# Patient Record
Sex: FEMALE | Employment: OTHER
[De-identification: names, ages, dates, MRNs, and addresses within clinical notes are randomized per-mention and may not be internally consistent; named-entity substitution may affect disease eponyms.]

---

## 2024-01-24 ENCOUNTER — HOSPITAL ENCOUNTER (OUTPATIENT)
Facility: HOSPITAL | Age: 39
Setting detail: RECURRING SERIES
Discharge: HOME | End: 2024-01-27
Payer: OTHER GOVERNMENT

## 2024-01-24 PROCEDURE — 97535 SELF CARE MNGMENT TRAINING: CPT

## 2024-01-24 PROCEDURE — 97162 PT EVAL MOD COMPLEX 30 MIN: CPT

## 2024-01-24 PROCEDURE — 97110 THERAPEUTIC EXERCISES: CPT

## 2024-01-24 NOTE — PROGRESS NOTES
FOTO score to 55 points in order to maximize function and promote patient satisfaction with overall outcome  Eval Status: FOTO 45  FOTO score = an established functional score where 100 = no disability    Patient will improve pain in left shoulder to 5/10 at worst to improve dressing tolerance and restore prior level of function.  Eval Status: 10/10 at worst    Pt will have 3/5 left shoulder flexion strength to return to goals of improved dressing and overhead motions.  Eval Status:   Shoulder Left (1-5) Right (1-5)   Shoulder Flexion 2 4   Shoulder ABD 2 4   Shoulder IR 3 4   Shoulder ER 3 3+   Elbow flexion 4- 4+       Pt will have painfree left shoulder AAROM WFL to aid in functional mechanics for ADLs.  Eval Status:   Shoulder Left   Flexion 132   Extension NT      ER neutral   IR 22     *AAROM IR/ER tested at 45dgs     Long Term Goals: To be accomplished in 24 treatments:  Patient will improve FOTO score to 72 points in order to maximize function and promote patient satisfaction with overall outcome.  Eval Status: FOTO 45  FOTO score = an established functional score where 100 = no disability    Pt will have painfree left shoulder AROM WFL to aid in functional mechanics for ADLs.  Eval Status:   Shoulder Left Right   Flexion 115 160   Extension 36 60   ABD 97 163   ER C4 T1   IR Ishial tub T4     *AAROM IR/ER tested at 45dgs     Pt will have 5/5 katya UE strength to return to goals of improved lifting and carrying objects needed to perform ADLs and IADLs.  Eval Status:   Shoulder Left (1-5) Right (1-5)   Shoulder Flexion 2 4   Shoulder ABD 2 4   Shoulder IR 3 4   Shoulder ER 3 3+   Elbow flexion 4- 4+       Patient will improve pain in left shoulder to 1-2/10 at worst to improve lifting and reaching tolerance and restore prior level of function.  Eval Status: 10/10 at worst    PLAN  [x]  Upgrade activities as tolerated     [x]  Continue plan of care  [x]  Update interventions per flow sheet       []

## 2024-01-24 NOTE — PROGRESS NOTES
treatments    Patient/ Caregiver education and instruction: Diagnosis, prognosis, self care, activity modification, and exercises     [x]  Plan of care has been reviewed with PTA    Certification Period: CONNIE Kapadia, PT 1/24/2024 12:53 PM  ____________________________________________________________________  I certify that the above Therapy Services are being furnished while the patient is under my care. I agree with the treatment plan and certify that this therapy is necessary.    Physician's Signature:____________________________Date:_________TIME:________                                      Jolie Puri,*  Insurance: Payor:  EAST / Plan:  EAST / Product Type: *No Product type* /      ** Signature, Date and Time must be completed for valid certification **    [facility]  [place] [address]  Ph [phone]  Fx [phone]

## 2024-01-29 ENCOUNTER — HOSPITAL ENCOUNTER (OUTPATIENT)
Facility: HOSPITAL | Age: 39
Setting detail: RECURRING SERIES
Discharge: HOME | End: 2024-02-01
Payer: OTHER GOVERNMENT

## 2024-01-29 PROCEDURE — 97110 THERAPEUTIC EXERCISES: CPT

## 2024-01-29 PROCEDURE — 97535 SELF CARE MNGMENT TRAINING: CPT

## 2024-01-29 PROCEDURE — 97530 THERAPEUTIC ACTIVITIES: CPT

## 2024-01-29 NOTE — PROGRESS NOTES
PHYSICAL / OCCUPATIONAL THERAPY - DAILY TREATMENT NOTE     Patient Name: Liset Costa    Date: 2024    : 1985  Insurance: Payor:  EAST / Plan: [facility] / Product Type: *No Product type* /      Patient  verified Yes     Visit #   Current / Total 2 24   Time   In / Out 11:49 12:43   Pain   In / Out 2/10 2/10   Subjective Functional Status/Changes: \"I just have some pain in my shoulder most of the time but, it gets higher when I raise it above my head.\"   Changes to:  Allergies, Med Hx, Sx Hx?   no       TREATMENT AREA =  Right shoulder pain [M25.511]  Left shoulder pain [M25.512]    OBJECTIVE    Modalities Rationale:     decrease edema, decrease inflammation, and decrease pain to improve patient's ability to progress to PLOF and address remaining functional goals.     min [] Estim Unattended, type/location:                                      []  w/ice    []  w/heat    min [] Estim Attended, type/location:                                     []  w/US     []  w/ice    []  w/heat    []  TENS insruct      min []  Mechanical Traction: type/lbs                   []  pro   []  sup   []  int   []  cont    []  before manual    []  after manual    min []  Ultrasound, settings/location:      min []  Iontophoresis w/ dexamethasone, location:                                               []  take home patch       []  in clinic        min  unbilled []  Ice     []  Heat    location/position:     min []  Paraffin,  details:    10 min [x]  Vasopneumatic Device, press/temp: Med/Low    min []  Whirlpool / Fluido:    If using vaso (only need to measure limb vaso being performed on)      pre-treatment girth : 38.5 cm,      post-treatment girth : 38 cm      measured at (landmark location) :  Acromion    min []  Other:    Skin assessment post-treatment (if applicable):    [x]  intact    []  redness- no adverse reaction                 []redness - adverse reaction:         Therapeutic Procedures:  Tx Min Billable

## 2024-01-31 ENCOUNTER — HOSPITAL ENCOUNTER (OUTPATIENT)
Facility: HOSPITAL | Age: 39
Setting detail: RECURRING SERIES
Discharge: HOME | End: 2024-02-03
Payer: OTHER GOVERNMENT

## 2024-01-31 PROCEDURE — 97530 THERAPEUTIC ACTIVITIES: CPT

## 2024-01-31 PROCEDURE — 97110 THERAPEUTIC EXERCISES: CPT

## 2024-01-31 PROCEDURE — 97112 NEUROMUSCULAR REEDUCATION: CPT

## 2024-01-31 PROCEDURE — 97016 VASOPNEUMATIC DEVICE THERAPY: CPT

## 2024-01-31 NOTE — PROGRESS NOTES
PHYSICAL / OCCUPATIONAL THERAPY - DAILY TREATMENT NOTE     Patient Name: Liset Costa    Date: 2024    : 1985  Insurance: Payor: [employer] EAST / Plan: [employer] EAST / Product Type: *No Product type* /      Patient  verified Yes     Visit #   Current / Total 3 24   Time   In / Out 915 1002   Pain   In / Out 2 3   Subjective Functional Status/Changes: Pt reported that she has been doing her exercises    Changes to:  Allergies, Med Hx, Sx Hx?   no       TREATMENT AREA =  Right shoulder pain [M25.511]  Left shoulder pain [M25.512]    OBJECTIVE    Modalities Rationale:     decrease edema, decrease inflammation, decrease pain, and increase tissue extensibility to improve patient's ability to progress to PLOF and address remaining functional goals.    10 min [x]  Vasopneumatic Device, press/temp: Mod/34dgs   If using vaso (only need to measure limb vaso being performed on)      pre-treatment girth : 41      post-treatment girth : 40      measured at (landmark location) :  acromion    min []  Other:    Skin assessment post-treatment (if applicable):    []  intact    []  redness- no adverse reaction                 []redness - adverse reaction:         Therapeutic Procedures:  Tx Min Billable or 1:1 Min (if diff from Tx Min) Procedure, Rationale, Specifics   15 15 59486 Therapeutic Exercise (timed):  increase ROM, strength, coordination, balance, and proprioception to improve patient's ability to progress to PLOF and address remaining functional goals. (see flow sheet as applicable)    Details if applicable:       12 12 26766 Neuromuscular Re-Education (timed):  improve balance, coordination, kinesthetic sense, posture, core stability and proprioception to improve patient's ability to develop conscious control of individual muscles and awareness of position of extremities in order to progress to PLOF and address remaining functional goals. (see flow sheet as applicable)    Details if applicable:     10 10 78580

## 2024-02-06 ENCOUNTER — HOSPITAL ENCOUNTER (OUTPATIENT)
Facility: HOSPITAL | Age: 39
Setting detail: RECURRING SERIES
Discharge: HOME | End: 2024-02-09
Payer: OTHER GOVERNMENT

## 2024-02-06 PROCEDURE — 97535 SELF CARE MNGMENT TRAINING: CPT

## 2024-02-06 PROCEDURE — 97530 THERAPEUTIC ACTIVITIES: CPT

## 2024-02-06 PROCEDURE — 97110 THERAPEUTIC EXERCISES: CPT

## 2024-02-06 NOTE — PROGRESS NOTES
PHYSICAL / OCCUPATIONAL THERAPY - DAILY TREATMENT NOTE     Patient Name: Liset Costa    Date: 2024    : 1985  Insurance: Payor:  EAST / Plan:  EAST / Product Type: *No Product type* /      Patient  verified Yes     Visit #   Current / Total 4 24   Time   In / Out 9:10 10:04   Pain   In / Out 2/10 2/10   Subjective Functional Status/Changes: \"I have a little pain in my Left shoulder.\"   Changes to:  Allergies, Med Hx, Sx Hx?   no       TREATMENT AREA =  Right shoulder pain [M25.511]  Left shoulder pain [M25.512]    OBJECTIVE    Therapeutic Procedures:  Tx Min Billable or 1:1 Min (if diff from Tx Min) Procedure, Rationale, Specifics   20  23739 Therapeutic Exercise (timed):  increase ROM, strength, coordination, balance, and proprioception to improve patient's ability to progress to PLOF and address remaining functional goals. (see flow sheet as applicable)    Details if applicable:         45003 Neuromuscular Re-Education (timed):  improve balance, coordination, kinesthetic sense, posture, core stability and proprioception to improve patient's ability to develop conscious control of individual muscles and awareness of position of extremities in order to progress to PLOF and address remaining functional goals. (see flow sheet as applicable)    Details if applicable:     14  07068 Therapeutic Activity (timed):  use of dynamic activities replicating functional movements to increase ROM, strength, coordination, balance, and proprioception in order to improve patient's ability to progress to PLOF and address remaining functional goals.  (see flow sheet as applicable)     Details if applicable:     10  93498 Self Care/Home Management (timed):  improve patient knowledge and understanding of pain reducing techniques, positioning, and posture/ergonomics  to improve patient's ability to progress to PLOF and address remaining functional goals.  (see flow sheet as applicable)    Details if

## 2024-02-08 ENCOUNTER — HOSPITAL ENCOUNTER (OUTPATIENT)
Facility: HOSPITAL | Age: 39
Setting detail: RECURRING SERIES
Discharge: HOME | End: 2024-02-11
Payer: OTHER GOVERNMENT

## 2024-02-08 PROCEDURE — 97016 VASOPNEUMATIC DEVICE THERAPY: CPT

## 2024-02-08 PROCEDURE — 97530 THERAPEUTIC ACTIVITIES: CPT

## 2024-02-08 PROCEDURE — 97112 NEUROMUSCULAR REEDUCATION: CPT

## 2024-02-08 PROCEDURE — 97110 THERAPEUTIC EXERCISES: CPT

## 2024-02-13 ENCOUNTER — HOSPITAL ENCOUNTER (OUTPATIENT)
Facility: HOSPITAL | Age: 39
Setting detail: RECURRING SERIES
Discharge: HOME | End: 2024-02-16
Payer: OTHER GOVERNMENT

## 2024-02-13 PROCEDURE — 97535 SELF CARE MNGMENT TRAINING: CPT

## 2024-02-13 PROCEDURE — 97530 THERAPEUTIC ACTIVITIES: CPT

## 2024-02-13 PROCEDURE — 97110 THERAPEUTIC EXERCISES: CPT

## 2024-02-13 PROCEDURE — 97016 VASOPNEUMATIC DEVICE THERAPY: CPT

## 2024-02-13 NOTE — PROGRESS NOTES
PHYSICAL / OCCUPATIONAL THERAPY - DAILY TREATMENT NOTE     Patient Name: Liset Costa    Date: 2024    : 1985  Insurance: Payor:  EAST / Plan: [facility] / Product Type: *No Product type* /      Patient  verified Yes     Visit #   Current / Total 6 24   Time   In / Out 8:58 9:53   Pain   In / Out 3/10 0/10   Subjective Functional Status/Changes: \"I have some pain right now. I moved weird over the weekend and had a sharp pain but, it went back down.\"   Changes to:  Allergies, Med Hx, Sx Hx?   no       TREATMENT AREA =  Right shoulder pain [M25.511]  Left shoulder pain [M25.512]    OBJECTIVE    Modalities Rationale:     decrease edema, decrease inflammation, and decrease pain to improve patient's ability to progress to PLOF and address remaining functional goals.     min [] Estim Unattended, type/location:                                      []  w/ice    []  w/heat    min [] Estim Attended, type/location:                                     []  w/US     []  w/ice    []  w/heat    []  TENS insruct      min []  Mechanical Traction: type/lbs                   []  pro   []  sup   []  int   []  cont    []  before manual    []  after manual    min []  Ultrasound, settings/location:      min []  Iontophoresis w/ dexamethasone, location:                                               []  take home patch       []  in clinic        min  unbilled []  Ice     []  Heat    location/position:     min []  Paraffin,  details:    10 min [x]  Vasopneumatic Device, press/temp:     min []  Whirlpool / Fluido:    If using vaso (only need to measure limb vaso being performed on)      pre-treatment girth : 38 cm      post-treatment girth : 37. 2 cm      measured at (landmark location) :  Acromion    min []  Other:    Skin assessment post-treatment (if applicable):    [x]  intact    []  redness- no adverse reaction                 []redness - adverse reaction:         Therapeutic Procedures:  Tx Min Billable or 1:1 Min

## 2024-02-15 ENCOUNTER — HOSPITAL ENCOUNTER (OUTPATIENT)
Facility: HOSPITAL | Age: 39
Setting detail: RECURRING SERIES
Discharge: HOME | End: 2024-02-18
Payer: OTHER GOVERNMENT

## 2024-02-15 PROCEDURE — 97110 THERAPEUTIC EXERCISES: CPT

## 2024-02-15 PROCEDURE — 97530 THERAPEUTIC ACTIVITIES: CPT

## 2024-02-15 PROCEDURE — 97016 VASOPNEUMATIC DEVICE THERAPY: CPT

## 2024-02-15 PROCEDURE — 97140 MANUAL THERAPY 1/> REGIONS: CPT

## 2024-02-15 NOTE — PROGRESS NOTES
PHYSICAL / OCCUPATIONAL THERAPY - DAILY TREATMENT NOTE     Patient Name: Liset Costa    Date: 2/15/2024    : 1985  Insurance: Payor:  EAST / Plan: [facility] / Product Type: *No Product type* /      Patient  verified Yes     Visit #   Current / Total 7 24   Time   In / Out 900 957   Pain   In / Out 2 2-3   Subjective Functional Status/Changes: Pt reports difficulty with fixing her hair.  Pt reports less difficulty with typing   Changes to:  Allergies, Med Hx, Sx Hx?   no       TREATMENT AREA =  Right shoulder pain [M25.511]  Left shoulder pain [M25.512]    OBJECTIVE    Modalities Rationale:     decrease edema, decrease inflammation, and decrease pain to improve patient's ability to progress to PLOF and address remaining functional goals.     min [] Estim Unattended, type/location:                                      []  w/ice    []  w/heat    min [] Estim Attended, type/location:                                     []  w/US     []  w/ice    []  w/heat    []  TENS insruct      min []  Mechanical Traction: type/lbs                   []  pro   []  sup   []  int   []  cont    []  before manual    []  after manual    min []  Ultrasound, settings/location:      min []  Iontophoresis w/ dexamethasone, location:                                               []  take home patch       []  in clinic        min  unbilled []  Ice     []  Heat    location/position:     min []  Paraffin,  details:    10 min []  Vasopneumatic Device, press/temp:  Mod : 34    min []  Whirlpool / Fluido:    If using vaso (only need to measure limb vaso being performed on)      pre-treatment girth : 38.5 cm      post-treatment girth : 37 cm      measured at (landmark location) :  left acromion    min []  Other:    Skin assessment post-treatment (if applicable):    []  intact    []  redness- no adverse reaction                 []redness - adverse reaction:         Therapeutic Procedures:  Tx Min Billable or 1:1 Min (if diff from Tx

## 2024-02-20 ENCOUNTER — HOSPITAL ENCOUNTER (OUTPATIENT)
Facility: HOSPITAL | Age: 39
Setting detail: RECURRING SERIES
Discharge: HOME | End: 2024-02-23
Payer: OTHER GOVERNMENT

## 2024-02-20 PROCEDURE — 97110 THERAPEUTIC EXERCISES: CPT

## 2024-02-20 PROCEDURE — 97016 VASOPNEUMATIC DEVICE THERAPY: CPT

## 2024-02-20 PROCEDURE — 97112 NEUROMUSCULAR REEDUCATION: CPT

## 2024-02-20 PROCEDURE — 97530 THERAPEUTIC ACTIVITIES: CPT

## 2024-02-20 PROCEDURE — 97140 MANUAL THERAPY 1/> REGIONS: CPT

## 2024-02-20 NOTE — PROGRESS NOTES
PHYSICAL / OCCUPATIONAL THERAPY - DAILY TREATMENT NOTE     Patient Name: Liset Costa    Date: 2024    : 1985  Insurance: Payor: [employer] EAST / Plan: [employer] EAST / Product Type: *No Product type* /      Patient  verified Yes     Visit #   Current / Total 8 24   Time   In / Out 9:00 AM 9:58 AM   Pain   In / Out 2 2   Subjective Functional Status/Changes: \"I've noticed a little more ROM.\" Patient has ortho follow up on 24.    Changes to:  Allergies, Med Hx, Sx Hx?   no       TREATMENT AREA =  Right shoulder pain [M25.511]  Left shoulder pain [M25.512]    OBJECTIVE    Modalities Rationale:     decrease edema, decrease inflammation, and decrease pain to improve patient's ability to progress to PLOF and address remaining functional goals.     min [] Estim Unattended, type/location:                                      []  w/ice    []  w/heat    min [] Estim Attended, type/location:                                     []  w/US     []  w/ice    []  w/heat    []  TENS insruct      min []  Mechanical Traction: type/lbs                   []  pro   []  sup   []  int   []  cont    []  before manual    []  after manual    min []  Ultrasound, settings/location:      min []  Iontophoresis w/ dexamethasone, location:                                               []  take home patch       []  in clinic        min  unbilled []  Ice     []  Heat    location/position:     min []  Paraffin,  details:    10 min [x]  Vasopneumatic Device, press/temp: Low/low    min []  Whirlpool / Fluido:    If using vaso (only need to measure limb vaso being performed on)      pre-treatment girth : 40.5 cm      post-treatment girth : 38.5 cm      measured at (landmark location) :  left acromion    min []  Other:    Skin assessment post-treatment (if applicable):    []  intact    []  redness- no adverse reaction                 []redness - adverse reaction:         Therapeutic Procedures:  Tx Min Billable or 1:1 Min (if diff from Tx

## 2024-02-23 ENCOUNTER — HOSPITAL ENCOUNTER (OUTPATIENT)
Facility: HOSPITAL | Age: 39
Setting detail: RECURRING SERIES
Discharge: HOME | End: 2024-02-26
Payer: OTHER GOVERNMENT

## 2024-02-23 PROCEDURE — 97112 NEUROMUSCULAR REEDUCATION: CPT

## 2024-02-23 PROCEDURE — 97535 SELF CARE MNGMENT TRAINING: CPT

## 2024-02-23 PROCEDURE — 97110 THERAPEUTIC EXERCISES: CPT

## 2024-02-23 PROCEDURE — 97530 THERAPEUTIC ACTIVITIES: CPT

## 2024-02-23 NOTE — PROGRESS NOTES
[facility]  [address] [place] [address]  Ph [phone]  Fx [phone]    Physical Therapy Progress Note  Patient name: Liset Costa Start of Care: 2024   Referral source: Jolie Puri,* : 1985               Medical Diagnosis: Right shoulder pain [M25.511]  Left shoulder pain [M25.512]    Onset Date:10/28/23               Treatment Diagnosis: M25.512  LEFT SHOULDER PAIN     Prior Hospitalization: see medical history Provider#: [id number]   Medications: Verified on Patient summary List   Comorbidities: DVT left leg 2020, OA katya knee   Prior Level of Function: functionally independent, no AD, active lifestyle     Visits from Start of Care: 9    Missed Visits: 0    Summary of Care/ Key Functional Changes: Patient has participated in 9 visits of skilled PT thus far for treatment of her left shoulder s/p ORIF humeral fracture on 23.  Patient is overall making steady progress towards goals, including now having fully met 4/5 STG's.  Patient's pain has reduced by 50% since starting physical therapy, though she still c/o 5/10 pain during all overhead motions and ER, which is causing her difficulty with functional activities.  Patient's strength of bilateral UE's has improved, though overall she does still have generalized weakness of her right>left UE's.  Patient also continues to have weakness of her scapular stabilizers as noted by poor posture.  Patient would continue to benefit from skilled PT interventions to further improve overall function, decrease pain, and progress towards unmet goals.    Patient will continue to benefit from skilled PT / OT services to modify and progress therapeutic interventions, analyze and address functional mobility deficits, analyze and address ROM deficits, analyze and address strength deficits, analyze and address soft tissue restrictions, analyze and cue for proper movement patterns, analyze and modify for postural 
[facility]   3/26/2024  9:10 AM Maryellen Dickens, PT [facility]   3/28/2024  9:10 AM Maryellen Dickens, PT [facility]   4/9/2024  9:10 AM Maryellen Dickens, PT [facility]   4/11/2024  9:10 AM Maryellen Dickens, PT [facility]   4/16/2024  9:10 AM Maryellen Dickens, PT [facility]   4/18/2024  9:10 AM Maryellen Dickens, PT [facility]   4/23/2024  9:10 AM Maryellen Dickens, PT [facility]

## 2024-02-27 ENCOUNTER — HOSPITAL ENCOUNTER (OUTPATIENT)
Facility: HOSPITAL | Age: 39
Setting detail: RECURRING SERIES
Discharge: HOME | End: 2024-03-01
Payer: OTHER GOVERNMENT

## 2024-02-27 PROCEDURE — 97016 VASOPNEUMATIC DEVICE THERAPY: CPT

## 2024-02-27 PROCEDURE — 97110 THERAPEUTIC EXERCISES: CPT

## 2024-02-27 PROCEDURE — 97535 SELF CARE MNGMENT TRAINING: CPT

## 2024-02-27 PROCEDURE — 97530 THERAPEUTIC ACTIVITIES: CPT

## 2024-02-29 ENCOUNTER — HOSPITAL ENCOUNTER (OUTPATIENT)
Facility: HOSPITAL | Age: 39
Setting detail: RECURRING SERIES
End: 2024-02-29
Payer: OTHER GOVERNMENT

## 2024-02-29 PROCEDURE — 97110 THERAPEUTIC EXERCISES: CPT

## 2024-02-29 PROCEDURE — 97016 VASOPNEUMATIC DEVICE THERAPY: CPT

## 2024-02-29 PROCEDURE — 97530 THERAPEUTIC ACTIVITIES: CPT

## 2024-02-29 PROCEDURE — 97535 SELF CARE MNGMENT TRAINING: CPT

## 2024-02-29 NOTE — PROGRESS NOTES
PHYSICAL / OCCUPATIONAL THERAPY - DAILY TREATMENT NOTE     Patient Name: Liset Costa    Date: 2024    : 1985  Insurance: Payor:  EAST / Plan: [email or web address] EAST / Product Type: *No Product type* /      Patient  verified Yes     Visit #   Current / Total 11 24   Time   In / Out 9:10 10:03   Pain   In / Out 2/10 0/10   Subjective Functional Status/Changes: \"I have a little pain in my shoulder.\"   Changes to:  Allergies, Med Hx, Sx Hx?   no       TREATMENT AREA =  Right shoulder pain [M25.511]  Left shoulder pain [M25.512]    OBJECTIVE    Modalities Rationale:     decrease edema, decrease inflammation, and decrease pain to improve patient's ability to progress to PLOF and address remaining functional goals.     min [] Estim Unattended, type/location:                                      []  w/ice    []  w/heat    min [] Estim Attended, type/location:                                     []  w/US     []  w/ice    []  w/heat    []  TENS insruct      min []  Mechanical Traction: type/lbs                   []  pro   []  sup   []  int   []  cont    []  before manual    []  after manual    min []  Ultrasound, settings/location:      min []  Iontophoresis w/ dexamethasone, location:                                               []  take home patch       []  in clinic        min  unbilled []  Ice     []  Heat    location/position:     min []  Paraffin,  details:    10 min [x]  Vasopneumatic Device, press/temp: Med/Low    min []  Whirlpool / Fluido:    If using vaso (only need to measure limb vaso being performed on)      pre-treatment girth : 48 cm      post-treatment girth : 47 cm      measured at (landmark location) :  Acromion    min []  Other:    Skin assessment post-treatment (if applicable):    [x]  intact    []  redness- no adverse reaction                 []redness - adverse reaction:         Therapeutic Procedures:  Tx Min Billable or 1:1 Min (if diff from Tx Min) Procedure, Rationale, Specifics   21   PROGRESSING  Current: Pt reports mild pain of a 2/10 at this time with occasional increases after daily activities 2/29/24    PLAN  Yes  Continue plan of care  []  Upgrade activities as tolerated  []  Discharge due to :  []  Other:    Diego Vallecillo [facility]    2/29/2024    7:57 AM    Future Appointments   Date Time Provider Department Center   2/29/2024  9:10 AM Diego Vallecillo, PTA [facility]   3/4/2024  9:10 AM Lyndsay Kapadia, PT [facility]   3/7/2024  9:10 AM Dimple Geronimo, PT [facility]   3/12/2024  9:10 AM Maryellen Dickens, PT [facility]   3/14/2024  9:10 AM Diego Vallecillo, PTA [facility]   3/19/2024  9:10 AM Maryellen Dickens, PT [facility]   3/21/2024  9:10 AM Maryellen Dickens K, PT [facility]   3/26/2024  9:10 AM Maryellen Dickens K, PT [facility]   3/28/2024  9:10 AM Maryellen Dickens K, PT [facility]   4/9/2024  9:10 AM Maryellen Dickens K, PT [facility]   4/11/2024  9:10 AM Maryellen Dickens K, PT [facility]   4/16/2024  9:10 AM Maryellen Dickens K, PT [facility]   4/18/2024  9:10 AM ChrissieMaryellen coleman K, PT [facility]   4/23/2024  9:10 AM Chrissie, Maryellen K, PT [facility]

## 2024-03-04 ENCOUNTER — HOSPITAL ENCOUNTER (OUTPATIENT)
Facility: HOSPITAL | Age: 39
Setting detail: RECURRING SERIES
Discharge: HOME | End: 2024-03-07
Payer: OTHER GOVERNMENT

## 2024-03-04 PROCEDURE — 97530 THERAPEUTIC ACTIVITIES: CPT

## 2024-03-04 PROCEDURE — 97110 THERAPEUTIC EXERCISES: CPT

## 2024-03-04 PROCEDURE — 97016 VASOPNEUMATIC DEVICE THERAPY: CPT

## 2024-03-04 PROCEDURE — 97112 NEUROMUSCULAR REEDUCATION: CPT

## 2024-03-04 NOTE — PROGRESS NOTES
treatments:  Patient will be independent and compliant with HEP to progress toward goals and restore functional mobility.   Eval Status: issued at eval  2/23/24 PN: Patient reports compliance with her HEP.   MET     Patient will improve FOTO score to 55 points in order to maximize function and promote patient satisfaction with overall outcome  Eval Status: FOTO 45  Current: 2/6/24: 59 MET   FOTO score = an established functional score where 100 = no disability     Patient will improve pain in left shoulder to 5/10 at worst to improve dressing tolerance and restore prior level of function.  Eval Status: 10/10 at worst  2/23/24 PN: 5/10 pain at worst in the last week   MET     Pt will have 3/5 left shoulder flexion strength to return to goals of improved dressing and overhead motions.  Eval Status:   Shoulder Left (1-5) Right (1-5)   Shoulder Flexion 2 4   Shoulder ABD 2 4   Shoulder IR 3 4   Shoulder ER 3 3+   Elbow flexion 4- 4+   2/23/24 PN: MET as of 2/20/24  Shoulder Left (1-5) Right (1-5)   Shoulder Flexion 3+/5 4+      Pt will have painfree left shoulder AAROM WFL to aid in functional mechanics for ADLs.  Eval Status:   Shoulder Left   Flexion 132   Extension NT      ER neutral   IR 22                           *AAROM IR/ER tested at 45dgs    2/23/24 PN: AAROM: flexion 142 degs, extension 55 degs, abduction 175 degs, ER at neutral 50 degs, IR WNL; 5/10 pain with all AAROM motions   PROGRESSING     Long Term Goals: To be accomplished in 24 treatments:  Patient will improve FOTO score to 72 points in order to maximize function and promote patient satisfaction with overall outcome.  Eval Status: FOTO 45  2/23/24 PN: 54   PROGRESSING  FOTO score = an established functional score where 100 = no disability     Pt will have painfree left shoulder AROM WFL to aid in functional mechanics for ADLs.  Eval Status:   Shoulder Left Right   Flexion 115 160   Extension 36 60   ABD 97 163   ER C4 T1   IR Ishial tub T4

## 2024-03-07 ENCOUNTER — HOSPITAL ENCOUNTER (OUTPATIENT)
Facility: HOSPITAL | Age: 39
Setting detail: RECURRING SERIES
Discharge: HOME | End: 2024-03-10
Payer: OTHER GOVERNMENT

## 2024-03-07 PROCEDURE — 97530 THERAPEUTIC ACTIVITIES: CPT

## 2024-03-07 PROCEDURE — 97110 THERAPEUTIC EXERCISES: CPT

## 2024-03-07 PROCEDURE — 97112 NEUROMUSCULAR REEDUCATION: CPT

## 2024-03-07 NOTE — PROGRESS NOTES
flexion strength to return to goals of improved dressing and overhead motions.  Eval Status:   Shoulder Left (1-5) Right (1-5)   Shoulder Flexion 2 4   Shoulder ABD 2 4   Shoulder IR 3 4   Shoulder ER 3 3+   Elbow flexion 4- 4+   2/23/24 PN: MET as of 2/20/24  Shoulder Left (1-5) Right (1-5)   Shoulder Flexion 3+/5 4+      Pt will have painfree left shoulder AAROM WFL to aid in functional mechanics for ADLs.  Eval Status:   Shoulder Left   Flexion 132   Extension NT      ER neutral   IR 22                           *AAROM IR/ER tested at 45dgs    2/23/24 PN: AAROM: flexion 142 degs, extension 55 degs, abduction 175 degs, ER at neutral 50 degs, IR WNL; 5/10 pain with all AAROM motions   PROGRESSING     Long Term Goals: To be accomplished in 24 treatments:  Patient will improve FOTO score to 72 points in order to maximize function and promote patient satisfaction with overall outcome.  Eval Status: FOTO 45  2/23/24 PN: 54   PROGRESSING  FOTO score = an established functional score where 100 = no disability     Pt will have painfree left shoulder AROM WFL to aid in functional mechanics for ADLs.  Eval Status:   Shoulder Left Right   Flexion 115 160   Extension 36 60   ABD 97 163   ER C4 T1   IR Ishial tub T4                     *AAROM IR/ER tested at 45dgs   2/23/24 PN: flexion 126 degs, scaption 112 degs, abduction 155 degs, overhead ER to T1 (equal bilaterally), behind the back IR to mid-glute; 5/10 pain during all AROM movements   PROGRESSING     Pt will have 5/5 katya UE strength to return to goals of improved lifting and carrying objects needed to perform ADLs and IADLs.  Eval Status:   Shoulder Left (1-5) Right (1-5)   Shoulder Flexion 2 4   Shoulder ABD 2 4   Shoulder IR 3 4   Shoulder ER 3 3+   Elbow flexion 4- 4+   2/23/24 PN: PROGRESSING as of 2/20/24  Shoulder Left (1-5) Right (1-5)   Shoulder Flexion 3+/5 4+   Shoulder ABD 3/5 4+   Shoulder IR 3+/5 4+   Shoulder ER 3+/5 4   Elbow flexion 4- 4+

## 2024-03-12 ENCOUNTER — HOSPITAL ENCOUNTER (OUTPATIENT)
Facility: HOSPITAL | Age: 39
Setting detail: RECURRING SERIES
Discharge: HOME | End: 2024-03-15
Payer: OTHER GOVERNMENT

## 2024-03-12 PROCEDURE — 97530 THERAPEUTIC ACTIVITIES: CPT

## 2024-03-12 PROCEDURE — 97112 NEUROMUSCULAR REEDUCATION: CPT

## 2024-03-12 PROCEDURE — 97110 THERAPEUTIC EXERCISES: CPT

## 2024-03-12 NOTE — PROGRESS NOTES
PROGRESSING as of 2/20/24  Shoulder Left (1-5) Right (1-5)   Shoulder Flexion 3+/5 4+   Shoulder ABD 3/5 4+   Shoulder IR 3+/5 4+   Shoulder ER 3+/5 4   Elbow flexion 4- 4+       Patient will improve pain in left shoulder to 1-2/10 at worst to improve lifting and reaching tolerance and restore prior level of function.  Eval Status: 10/10 at worst  2/23/24 PN: 5/10 pain at worst in the last week   PROGRESSING  Current: 2/10 pain at worst   3/12/24, MET    PLAN  Yes  Continue plan of care  []  Upgrade activities as tolerated  []  Discharge due to :  []  Other:    Maryellen Dickens, FABIOLA    3/12/2024    9:14 AM    Future Appointments   Date Time Provider Department Center   3/14/2024  9:10 AM Dimple Geronimo, PT [facility]   3/19/2024  9:10 AM Maryellen Dickens, PT [facility]   3/21/2024  9:10 AM Maryellen Dickens, PT [facility]   3/26/2024  9:10 AM Maryellen Dickens, PT [facility]   3/28/2024  9:10 AM Maryellen Dickens, PT [facility]   4/9/2024  9:10 AM Maryellen Dickens, PT [facility]   4/11/2024  9:10 AM Maryellen Dickens, PT [facility]   4/16/2024  9:10 AM Maryellen Dickens, PT [facility]   4/18/2024  9:10 AM Maryellen Dickens, PT [facility]   4/23/2024  9:10 AM Maryellen Dickens, PT [facility]

## 2024-03-14 ENCOUNTER — APPOINTMENT (OUTPATIENT)
Facility: HOSPITAL | Age: 39
End: 2024-03-14
Payer: OTHER GOVERNMENT

## 2024-03-19 ENCOUNTER — HOSPITAL ENCOUNTER (OUTPATIENT)
Facility: HOSPITAL | Age: 39
Setting detail: RECURRING SERIES
Discharge: HOME | End: 2024-03-22
Payer: OTHER GOVERNMENT

## 2024-03-19 PROCEDURE — 97110 THERAPEUTIC EXERCISES: CPT

## 2024-03-19 PROCEDURE — 97112 NEUROMUSCULAR REEDUCATION: CPT

## 2024-03-19 PROCEDURE — 97530 THERAPEUTIC ACTIVITIES: CPT

## 2024-03-19 NOTE — PROGRESS NOTES
tolerance and restore prior level of function.  Eval Status: 10/10 at worst  2/23/24 PN: 5/10 pain at worst in the last week   PROGRESSING  Current: 2/10 pain at worst   3/12/24, MET    PLAN  Yes  Continue plan of care  []  Upgrade activities as tolerated  []  Discharge due to :  []  Other:    Maryellen Dickens PT    3/19/2024    9:12 AM    Future Appointments   Date Time Provider Department Center   3/21/2024  9:10 AM Maryellen Dickens, PT [facility]   3/26/2024  9:10 AM Maryellen Dickens, PT [facility]   3/28/2024  9:10 AM Maryellen Dickens, PT [facility]   4/9/2024  9:10 AM Maryellen Dickens, PT [facility]   4/11/2024  9:10 AM Maryellen Dickens, PT [facility]   4/16/2024  9:10 AM Maryellen Dickens, PT [facility]   4/18/2024  9:10 AM Maryellen Dickens, PT [facility]   4/23/2024  9:10 AM Maryellen Dickens, PT [facility]

## 2024-03-21 ENCOUNTER — HOSPITAL ENCOUNTER (OUTPATIENT)
Facility: HOSPITAL | Age: 39
Setting detail: RECURRING SERIES
Discharge: HOME | End: 2024-03-24
Payer: OTHER GOVERNMENT

## 2024-03-21 PROCEDURE — 97110 THERAPEUTIC EXERCISES: CPT

## 2024-03-21 PROCEDURE — 97530 THERAPEUTIC ACTIVITIES: CPT

## 2024-03-21 NOTE — PROGRESS NOTES
Physical Therapy Discharge Instructions    [facility] at [facility]  [address] [place] [address]  Ph [phone]  Fx [phone]      Patient: Liset Costa  : 1985      Continue Home Exercise Program 1 times per day for 4 weeks, then decrease to 3 times per week      Continue with    [x] Ice  as needed     [x] Heat           Follow up with MD:     [] Upon completion of therapy     [x] As needed      Recommendations:     [x]   Return to activity with home program    []   Return to activity with the following modifications:       []Post Rehab Program    []Join Independent aquatic program     []Return to/join local gym      Additional Comments: Thank you for choosing [facility]!    Maryellen Dickens, PT 3/21/2024 9:47 AM

## 2024-03-21 NOTE — PROGRESS NOTES
[facility] at [facility]  [address], [place] [address]  Phone [phone]  Fax [phone]    Physical Therapy Discharge Summary    Patient name: Liset Costa Start of Care: 2024   Referral source: Jolie Puri,* : 1985               Medical Diagnosis: Right shoulder pain [M25.511]  Left shoulder pain [M25.512]    Onset Date:10/28/23               Treatment Diagnosis: M25.512  LEFT SHOULDER PAIN     Prior Hospitalization: see medical history Provider#: [id number]   Medications: Verified on Patient summary List   Comorbidities: DVT left leg 2020, OA katya knee   Prior Level of Function: functionally independent, no AD, active lifestyle     Visits from Start of Care: 16    Missed Visits: 0    Reporting Period : 24 to 3/21/24    Assessment / Summary of Care:  Patient has completed 16 visits of skilled PT for treatment of her left shoulder s/p humeral fracture with ORIF on 23.  Patient reports she is overall doing very well, including little to no pain during all functional activities and movements.  Patient has regained functional AROM of her left shoulder, including with overhead ER and behind the back IR which have allowed her to being able to style her hair like normal and also don/doff bra like she normally would.  Patient's UE strength has also greatly improved, including 5/5 MMT with the exception of very mild weakness with testing of her left shoulder external rotators.  However, patient is independent with her HEP and is knowledgeable of how to continue to address this weakness with home exercises.  Patient's FOTO score has improved from 45 on eval to 59 most recently, so she has partially met FOTO goal.  Patient is pleased with her current outcome and is independent with her HEP, so will discharge from skilled PT at this time.  Thank you for the referral to [facility].    Short Term Goals: To be accomplished in 12 treatments:  Patient will be

## 2024-03-26 ENCOUNTER — APPOINTMENT (OUTPATIENT)
Facility: HOSPITAL | Age: 39
End: 2024-03-26
Payer: OTHER GOVERNMENT

## 2024-03-28 ENCOUNTER — APPOINTMENT (OUTPATIENT)
Facility: HOSPITAL | Age: 39
End: 2024-03-28
Payer: OTHER GOVERNMENT